# Patient Record
Sex: FEMALE | Race: WHITE | Employment: OTHER | URBAN - NONMETROPOLITAN AREA
[De-identification: names, ages, dates, MRNs, and addresses within clinical notes are randomized per-mention and may not be internally consistent; named-entity substitution may affect disease eponyms.]

---

## 2017-07-27 ENCOUNTER — HOSPITAL ENCOUNTER (EMERGENCY)
Facility: HOSPITAL | Age: 57
Discharge: HOME | End: 2017-07-27
Attending: NURSE PRACTITIONER | Admitting: NURSE PRACTITIONER
Payer: COMMERCIAL

## 2017-07-27 VITALS
TEMPERATURE: 98.4 F | HEART RATE: 65 BPM | RESPIRATION RATE: 16 BRPM | SYSTOLIC BLOOD PRESSURE: 100 MMHG | DIASTOLIC BLOOD PRESSURE: 59 MMHG | OXYGEN SATURATION: 98 %

## 2017-07-27 DIAGNOSIS — S61.411A LACERATION OF RIGHT HAND, FOREIGN BODY PRESENCE UNSPECIFIED, INITIAL ENCOUNTER: ICD-10-CM

## 2017-07-27 PROCEDURE — 12002 RPR S/N/AX/GEN/TRNK2.6-7.5CM: CPT

## 2017-07-27 PROCEDURE — 25000128 H RX IP 250 OP 636: Performed by: NURSE PRACTITIONER

## 2017-07-27 PROCEDURE — 90471 IMMUNIZATION ADMIN: CPT

## 2017-07-27 PROCEDURE — 90715 TDAP VACCINE 7 YRS/> IM: CPT | Performed by: NURSE PRACTITIONER

## 2017-07-27 PROCEDURE — 12002 RPR S/N/AX/GEN/TRNK2.6-7.5CM: CPT | Performed by: NURSE PRACTITIONER

## 2017-07-27 PROCEDURE — 40000268 ZZH STATISTIC NO CHARGES

## 2017-07-27 RX ADMIN — CLOSTRIDIUM TETANI TOXOID ANTIGEN (FORMALDEHYDE INACTIVATED), CORYNEBACTERIUM DIPHTHERIAE TOXOID ANTIGEN (FORMALDEHYDE INACTIVATED), BORDETELLA PERTUSSIS TOXOID ANTIGEN (GLUTARALDEHYDE INACTIVATED), BORDETELLA PERTUSSIS FILAMENTOUS HEMAGGLUTININ ANTIGEN (FORMALDEHYDE INACTIVATED), BORDETELLA PERTUSSIS PERTACTIN ANTIGEN, AND BORDETELLA PERTUSSIS FIMBRIAE 2/3 ANTIGEN 0.5 ML: 5; 2; 2.5; 5; 3; 5 INJECTION, SUSPENSION INTRAMUSCULAR at 18:55

## 2017-07-27 ASSESSMENT — ENCOUNTER SYMPTOMS
CONSTITUTIONAL NEGATIVE: 1
WOUND: 1

## 2017-07-27 NOTE — ED NOTES
C/o laceration to right hand while reaching into the  and cutting hand on knife bandage applied in triage

## 2017-07-27 NOTE — ED PROVIDER NOTES
"  History     Chief Complaint   Patient presents with     Laceration     Right hand.     The history is provided by the patient. No  was used.     Yoli Zarco is a 57 year old female who presents with a right hand laceration. Cut it on a knife at home taking it out of the . Wasn't able to control the bleeding at home. AFROM of hand and fingers.     I have reviewed the Medications, Allergies, Past Medical and Surgical History, and Social History in the Epic system.    Allergies:   Allergies   Allergen Reactions     Levaquin [Levofloxacin] GI Disturbance         No current facility-administered medications on file prior to encounter.   No current outpatient prescriptions on file prior to encounter.    Patient Active Problem List   Diagnosis     Dysfunctional uterine bleeding       Past Surgical History:   Procedure Laterality Date     CHOLECYSTECTOMY  2003       Social History   Substance Use Topics     Smoking status: Never Smoker     Smokeless tobacco: Never Used     Alcohol use No       Most Recent Immunizations   Administered Date(s) Administered     TDAP Vaccine (Adacel) 07/27/2017   Pended Date(s) Pended     TDAP Vaccine (Adacel) 07/27/2017       BMI: Estimated body mass index is 18.88 kg/(m^2) as calculated from the following:    Height as of 3/24/16: 1.676 m (5' 6\").    Weight as of 3/24/16: 53.1 kg (117 lb).      Review of Systems   Constitutional: Negative.    Musculoskeletal:        AFROM in Right hand   Skin: Positive for wound (hand laceration).       Physical Exam   BP: 100/59  Pulse: 65  Temp: 98.4  F (36.9  C)  Resp: 16  SpO2: 98 %  Physical Exam   Constitutional: She is oriented to person, place, and time. She appears well-developed and well-nourished. No distress.   HENT:   Head: Normocephalic and atraumatic.   Eyes: Conjunctivae are normal. No scleral icterus.   Neck: Normal range of motion.   Pulmonary/Chest: Effort normal.   Musculoskeletal: Normal range of " motion.        Right hand: She exhibits laceration. She exhibits normal range of motion and no deformity. Normal sensation noted.        Hands:  AFROM of R hand, sensation intact.    Neurological: She is alert and oriented to person, place, and time.   Skin: Skin is warm. She is not diaphoretic.   Psychiatric: She has a normal mood and affect. Her behavior is normal.   Nursing note and vitals reviewed.      ED Course     ED Course     Laceration repair  Performed by: LINCOLN JAQUEZ  Authorized by: LINCOLN JAQUEZ   Consent: Verbal consent obtained.  Risks and benefits: risks, benefits and alternatives were discussed  Consent given by: patient  Patient identity confirmed: verbally with patient and arm band  Body area: upper extremity  Location details: right hand  Laceration length: 3 (laceration between index finger and thumb) cm  Foreign bodies: no foreign bodies  Tendon involvement: none  Nerve involvement: none  Anesthesia: local infiltration    Anesthesia:  Local Anesthetic: lidocaine 2% with epinephrine  Anesthetic total: 2 mL    Sedation:  Patient sedated: no  Preparation: Patient was prepped and draped in the usual sterile fashion.  Skin closure: 4-0 nylon  Number of sutures: 4  Technique: simple  Approximation: close  Approximation difficulty: simple  Dressing: 4x4 sterile gauze, antibiotic ointment and splint        Assessments & Plan (with Medical Decision Making)     I have reviewed the nursing notes.    I have reviewed the findings, diagnosis, plan and need for follow up with the patient.    Plan: Keep clean and dry x 24 hours, then may shower as usual. Do not soak or swim  Take Tylenol per package directions for pain  Call tomorrow to make a follow up appointment for suture removal in 7 days  Watch for signs and symptoms of infection: Increasing redness, pain, warmth, fever and follow up here or with PCP if any arise  Patient verbally educated and discharge instructions given, verbalizes  understanding, and has no questions.      Final diagnoses:   Laceration of right hand, foreign body presence unspecified, initial encounter       7/27/2017   [facility]     Jenny Miller NP  07/27/17 1938

## 2017-07-27 NOTE — ED AVS SNAPSHOT
[facility]    [address] [address]    Phone:  [phone]                                       Yoli Zarco   MRN: [id number]    Department:  [facility]   Date of Visit:  7/27/2017           After Visit Summary Signature Page     I have received my discharge instructions, and my questions have been answered. I have discussed any challenges I see with this plan with the nurse or doctor.    ..........................................................................................................................................  Patient/Patient Representative Signature      ..........................................................................................................................................  Patient Representative Print Name and Relationship to Patient    ..................................................               ................................................  Date                                            Time    ..........................................................................................................................................  Reviewed by Signature/Title    ...................................................              ..............................................  Date                                                            Time

## 2017-07-27 NOTE — ED AVS SNAPSHOT
[facility]    [address] [address]    Phone:  [phone]                                       Yoli Zarco   MRN: [id number]    Department:  [facility]   Date of Visit:  7/27/2017           Patient Information     Date Of Birth          1960        Your diagnoses for this visit were:     Laceration of right hand, foreign body presence unspecified, initial encounter        You were seen by Jenny Miller NP.      Follow-up Information     Follow up with Cher Perry NP In 1 week.    Specialty:  Nurse Practitioner    Why:  For suture removal    Contact information:    [facility]  [address] [place] [address] [phone]          Follow up with [facility].    Specialty:  EMERGENCY MEDICINE    Why:  for suture removal if needed or symptoms develop    Contact information:    [address] [address] [phone]    Additional information:    From [facility]: Take [address]. Turn left at [address]. Turn left at the first stoplight on [address]. At the first stop sign, take a right onto [address]. Take a left into the parking lot and continue through until you reach the North enterance of the building.       From [place]: Take [address]. Take the [address] ramp towards [place]. Turn left onto [address]. Take a slight right onto [address]. Turn left at the first stoplight on [address]. At the first stop sign, take a right onto [address]. Take a left into the parking lot and continue through until you reach the North enterance of the building.       From [place]: Take [address]. Take a right at [address]. At the first stop sign, take a right onto [address]. Take a left into the parking lot and continue through until you reach the North enterance of the building.         Discharge  Instructions       You can stop in here to have the sutures removed next Thursday morning. Or see your PCP to have this done as well.   No soaking the hand. Watch for infection.   Use the splint during the day to avoid overusing the hand and opening the wound.    Change bandage 2 times a day.       Hand Laceration: All Closures  A laceration is a cut through the skin. You have a cut on the hand. Deep cuts usually require stitches (sutures) or staples. Minor cuts may be closed with surgical tape or skin adhesive.   X-rays may be done if something may have entered the skin through the cut. Your may also be given a tetanus shot. This may be given if you are not updated on this vaccination and the object that cut you may carry tetanus.    Home care    Your healthcare provider may prescribe an antibiotic. This is to help prevent infection. Follow all instructions for taking this medicine. Take the medicine every day until it is gone or you are told to stop. You should not have any left over.    The healthcare provider may prescribe medicines for pain. Follow instructions for taking them.    Follow the healthcare provider s instructions on how to care for the cut.    Keep the wound clean and dry. Do not get the wound wet until you are told it is okay to do so. If the bandage gets wet, remove it. Gently pat the wound dry with a clean cloth. Then put on a clean, dry bandage..    To help prevent infection, wash your hands with soap and water before and after caring for the wound.     Caring for sutures or staples: Once you no longer need to keep them dry, clean the wound daily. First, remove the bandage. Then wash the area gently with soap and warm water, or as directed by the health care provider. Use a wet cotton swab to loosen and remove any blood or crust that forms. After cleaning, apply a thin layer of antibiotic ointment if advised. Then put on a new bandage unless you are told not to.    Caring for skin glue: Don t  put apply liquid, ointment, or cream on the wound while the glue is in place. Avoid activities that cause heavy sweating. Protect the wound from sunlight. Do not scratch, rub, or pick at the adhesive film. Do not place tape directly over the film. The glue should peel off within 5 to 10 days.     Caring for surgical tape: Keep the area dry. If it gets wet, blot it dry with a clean towel. Surgical tape usually falls off within 7 to 10 days. If it has not fallen off after 10 days, you can take it off yourself. Put mineral oil or petroleum jelly on a cotton ball and gently rub the tape until it is removed.    Once you can get the wound wet, you may shower as usual but do not soak the wound in water (no tub baths or swimming)    Even with proper treatment, a wound infection may sometimes occur. Check the wound daily for signs of infection listed below.  Follow-up care  Follow up with your healthcare provider as advised. If you have stitches or staples, be sure to return as directed to have them removed.  When to seek medical advice  Call your healthcare provider right away if any of these occur:    Wound bleeding not controlled by direct pressure    Signs of infection, including increasing pain in the wound, increasing wound redness or swelling, or pus or bad odor coming from the wound    Fever of 100.4 F (38. C) or as directed by your health care provider    Stitches or staples come apart or fall out or surgical tape falls off before 7 days    Wound edges re-open    Wound changes colors    Numbness or weakness in the affected hand     Decreased movement of the hand  Date Last Reviewed: 6/10/2015    9185-0664 [employer]. [address] [place] [address], [place] [address]. All rights reserved. This information is not intended as a substitute for professional medical care. Always follow your healthcare professional's instructions.             Review of your medicines      Our records show that you are taking the  "medicines listed below. If these are incorrect, please call your family doctor or clinic.        Dose / Directions Last dose taken    VITAMIN D (CHOLECALCIFEROL) PO        Take by mouth daily   Refills:  0                Orders Needing Specimen Collection     None      Pending Results     No orders found from 2017 to 2017.            Pending Culture Results     No orders found from 2017 to 2017.            Thank you for choosing [facility]       Thank you for choosing [facility] for your care. Our goal is always to provide you with excellent care. Hearing back from our patients is one way we can continue to improve our services. Please take a few minutes to complete the written survey that you may receive in the mail after you visit with us. Thank you!        [employer] Information     [employer] lets you send messages to your doctor, view your test results, renew your prescriptions, schedule appointments and more. To sign up, go to [email or web address] . Click on \"Log in\" on the left side of the screen, which will take you to the Welcome page. Then click on \"Sign up Now\" on the right side of the page.     You will be asked to enter the access code listed below, as well as some personal information. Please follow the directions to create your username and password.     Your access code is: [email or web address]  Expires: 10/25/2017  7:35 PM     Your access code will  in 90 days. If you need help or a new code, please call your [facility] clinic or [phone].        Care EveryWhere ID     This is your Care EveryWhere ID. This could be used by other organizations to access your [facility] medical records  [id number]        Equal Access to Services     [facility]: Hadii ariel Perry, waaxda luqadaha, qaybta kaalmada delonte, sony delaney. So [facility] [phone].    ATENCIÓN: Si habla español, tiene a philip disposición servicios gratuitos de asistencia lingüística. " Linnea mera [phone].    We comply with applicable federal civil rights laws and [place] laws. We do not discriminate on the basis of race, color, national origin, age, disability sex, sexual orientation or gender identity.            After Visit Summary       This is your record. Keep this with you and show to your community pharmacist(s) and doctor(s) at your next visit.

## 2017-07-28 NOTE — DISCHARGE INSTRUCTIONS
You can stop in here to have the sutures removed next Thursday morning. Or see your PCP to have this done as well.   No soaking the hand. Watch for infection.   Use the splint during the day to avoid overusing the hand and opening the wound.    Change bandage 2 times a day.       Hand Laceration: All Closures  A laceration is a cut through the skin. You have a cut on the hand. Deep cuts usually require stitches (sutures) or staples. Minor cuts may be closed with surgical tape or skin adhesive.   X-rays may be done if something may have entered the skin through the cut. Your may also be given a tetanus shot. This may be given if you are not updated on this vaccination and the object that cut you may carry tetanus.    Home care    Your healthcare provider may prescribe an antibiotic. This is to help prevent infection. Follow all instructions for taking this medicine. Take the medicine every day until it is gone or you are told to stop. You should not have any left over.    The healthcare provider may prescribe medicines for pain. Follow instructions for taking them.    Follow the healthcare provider s instructions on how to care for the cut.    Keep the wound clean and dry. Do not get the wound wet until you are told it is okay to do so. If the bandage gets wet, remove it. Gently pat the wound dry with a clean cloth. Then put on a clean, dry bandage..    To help prevent infection, wash your hands with soap and water before and after caring for the wound.     Caring for sutures or staples: Once you no longer need to keep them dry, clean the wound daily. First, remove the bandage. Then wash the area gently with soap and warm water, or as directed by the health care provider. Use a wet cotton swab to loosen and remove any blood or crust that forms. After cleaning, apply a thin layer of antibiotic ointment if advised. Then put on a new bandage unless you are told not to.    Caring for skin glue: Don t put apply liquid,  ointment, or cream on the wound while the glue is in place. Avoid activities that cause heavy sweating. Protect the wound from sunlight. Do not scratch, rub, or pick at the adhesive film. Do not place tape directly over the film. The glue should peel off within 5 to 10 days.     Caring for surgical tape: Keep the area dry. If it gets wet, blot it dry with a clean towel. Surgical tape usually falls off within 7 to 10 days. If it has not fallen off after 10 days, you can take it off yourself. Put mineral oil or petroleum jelly on a cotton ball and gently rub the tape until it is removed.    Once you can get the wound wet, you may shower as usual but do not soak the wound in water (no tub baths or swimming)    Even with proper treatment, a wound infection may sometimes occur. Check the wound daily for signs of infection listed below.  Follow-up care  Follow up with your healthcare provider as advised. If you have stitches or staples, be sure to return as directed to have them removed.  When to seek medical advice  Call your healthcare provider right away if any of these occur:    Wound bleeding not controlled by direct pressure    Signs of infection, including increasing pain in the wound, increasing wound redness or swelling, or pus or bad odor coming from the wound    Fever of 100.4 F (38. C) or as directed by your health care provider    Stitches or staples come apart or fall out or surgical tape falls off before 7 days    Wound edges re-open    Wound changes colors    Numbness or weakness in the affected hand     Decreased movement of the hand  Date Last Reviewed: 6/10/2015    8445-5853 [employer]. [address] [place] [address], [place] [address]. All rights reserved. This information is not intended as a substitute for professional medical care. Always follow your healthcare professional's instructions.

## 2018-10-14 ENCOUNTER — APPOINTMENT (OUTPATIENT)
Dept: GENERAL RADIOLOGY | Facility: HOSPITAL | Age: 58
End: 2018-10-14
Attending: NURSE PRACTITIONER
Payer: COMMERCIAL

## 2018-10-14 ENCOUNTER — HOSPITAL ENCOUNTER (EMERGENCY)
Facility: HOSPITAL | Age: 58
Discharge: HOME | End: 2018-10-14
Attending: NURSE PRACTITIONER | Admitting: NURSE PRACTITIONER
Payer: COMMERCIAL

## 2018-10-14 VITALS
OXYGEN SATURATION: 99 % | RESPIRATION RATE: 16 BRPM | DIASTOLIC BLOOD PRESSURE: 70 MMHG | SYSTOLIC BLOOD PRESSURE: 123 MMHG | TEMPERATURE: 97 F

## 2018-10-14 DIAGNOSIS — S93.601A SPRAIN OF RIGHT FOOT, INITIAL ENCOUNTER: ICD-10-CM

## 2018-10-14 PROCEDURE — G0463 HOSPITAL OUTPT CLINIC VISIT: HCPCS

## 2018-10-14 PROCEDURE — 73630 X-RAY EXAM OF FOOT: CPT | Mod: TC,RT

## 2018-10-14 PROCEDURE — 99212 OFFICE O/P EST SF 10 MIN: CPT | Performed by: NURSE PRACTITIONER

## 2018-10-14 ASSESSMENT — ENCOUNTER SYMPTOMS
TROUBLE SWALLOWING: 0
ACTIVITY CHANGE: 1
WEAKNESS: 0
CHILLS: 0
COUGH: 0
PSYCHIATRIC NEGATIVE: 1
APPETITE CHANGE: 0
DYSURIA: 0
FEVER: 0
NUMBNESS: 0
COLOR CHANGE: 0
WOUND: 0

## 2018-10-14 NOTE — ED PROVIDER NOTES
History     Chief Complaint   Patient presents with     Foot Pain     rt foot pain, notes injury today while going up and down stairs     The history is provided by the patient and a relative (Daughter). No  was used.     Yoli Zarco is a 58 year old female who presents with right foot pain. She was stepping down on the stairs and felt pain in her right foot. Eating and drinking well. Bowel and bladder are working well. She is able to bear weight on heel of right foot.       Problem List:    Patient Active Problem List    Diagnosis Date Noted     Dysfunctional uterine bleeding 03/24/2016     Priority: Medium        Past Medical History:    Past Medical History:   Diagnosis Date     Blepharitis of both eyes      Cataracts, both eyes      Hx of concussion      Myopia, bilateral      Stress incontinence in female        Past Surgical History:    Past Surgical History:   Procedure Laterality Date     CHOLECYSTECTOMY  2003       Family History:    Family History   Problem Relation Age of Onset     Cerebrovascular Disease Father      Coronary Artery Disease Father      Diabetes Mother      Coronary Artery Disease Mother      Skin Cancer Sister      Sarcoidosis Sister      Diabetes Brother      Pancreatic Cancer Paternal Grandmother        Social History:  Marital Status:   [2]  Social History   Substance Use Topics     Smoking status: Never Smoker     Smokeless tobacco: Never Used     Alcohol use No        Medications:      VITAMIN D, CHOLECALCIFEROL, PO         Review of Systems   Constitutional: Positive for activity change. Negative for appetite change, chills and fever.   HENT: Negative for trouble swallowing.    Respiratory: Negative for cough.    Genitourinary: Negative for dysuria.   Musculoskeletal:        Right foot pain.    Skin: Negative for color change, rash and wound.   Neurological: Negative for weakness and numbness.   Psychiatric/Behavioral: Negative.        Physical Exam    BP: 123/70  Heart Rate: 62  Temp: 97  F (36.1  C)  Resp: 16  SpO2: 99 %      Physical Exam   Constitutional: She is oriented to person, place, and time. She appears well-developed and well-nourished. No distress.   HENT:   Head: Normocephalic.   Mouth/Throat: Oropharynx is clear and moist.   Neck: Normal range of motion. Neck supple.   Cardiovascular: Normal rate and intact distal pulses.    Pulmonary/Chest: Effort normal. No respiratory distress.   Abdominal: Soft. She exhibits no distension.   Musculoskeletal: She exhibits tenderness. She exhibits no edema or deformity.   CMS and ROM intact to right lower extremity. Right dorsalis pedis +2. Extension and flexion intact to right lower extremity. Tenderness to lateral midfoot along 5th metatarsal. Slight swelling appreciated. No deformity. Flexion and dorsiflexion intact to right foot.    Neurological: She is alert and oriented to person, place, and time. She exhibits normal muscle tone.   Skin: Skin is warm and dry. No rash noted. She is not diaphoretic.   Psychiatric: She has a normal mood and affect. Her behavior is normal.   Nursing note and vitals reviewed.      ED Course     ED Course     Procedures    I personally reviewed the x-rays and there is NO fracture or dislocation. Radiology review pending and nurse will notify patient if there is any change in the treatment plan.    Results for orders placed or performed during the hospital encounter of 10/14/18   Foot  XR, G/E 3 views, right    Narrative    PROCEDURE:  XR FOOT RT G/E 3 VW    HISTORY: Foot pain. Injured while going down stairs.; .    COMPARISON:  None.    TECHNIQUE:  3 views right foot.    FINDINGS:  Mild hallux valgus deformity with bunion formation is seen.  No fracture or dislocation is identified. The joint spaces are  preserved. No foreign body is seen.       Impression    IMPRESSION: No acute fracture.      TIFFANI HANCOCK MD       Assessments & Plan (with Medical Decision Making)      Discussed plan of care. She verbalized understanding. All questions answered.     I have reviewed the nursing notes.    I have reviewed the findings, diagnosis, plan and need for follow up with the patient.  Discharged in stable condition.     Discharge Medication List as of 10/14/2018  6:23 PM          Final diagnoses:   Sprain of right foot, initial encounter     Take tylenol and/or ibuprofen for pain. Follow dosing on package.   Apply ice to right foot for 20 minutes every 1-2 hours. Protect skin.   Elevate right foot as much as able.   See RICE handout.   Wear ace wrap and ortho shoe to right foot while walking. Take off when resting.   Work note.   Follow up with PCP in 10 days.   Return to urgent care or emergency department with any increase in symptoms or concerns.     AYANNA Mabry  10/14/2018  5:42 PM  [facility]       Eugenia Arita NP  10/16/18 0850

## 2018-10-14 NOTE — ED AVS SNAPSHOT
[facility]    [address] [address]    Phone:  [phone]                                       Yoli Zarco   MRN: [id number]    Department:  [facility]   Date of Visit:  10/14/2018           Patient Information     Date Of Birth          1960        Your diagnoses for this visit were:     Sprain of right foot, initial encounter        You were seen by Eugenia Arita NP.      Follow-up Information     Follow up with Cher Perry NP In 10 days.    Specialty:  Nurse Practitioner    Why:  For re-evaluation if pain persists.     Contact information:    [facility]  [address] [facility] [address] [phone]          Follow up with [facility].    Specialty:  EMERGENCY MEDICINE    Why:  As needed, If symptoms worsen    Contact information:    [address] [address] [phone]    Additional information:    From [facility]: Take [address]. Turn left at [address]. Turn left at the first stoplight on [address]. At the first stop sign, take a right onto [address]. Take a left into the parking lot and continue through until you reach the North enterance of the building.       From [place]: Take [address]. Take the [address] ramp towards [place]. Turn left onto [address]. Take a slight right onto [address]. Turn left at the first stoplight on [address]. At the first stop sign, take a right onto [address]. Take a left into the parking lot and continue through until you reach the North enterance of the building.       From [place]: Take [address]. Take a right at [address]. At the first stop sign, take a right onto [address]. Take a left into the parking lot and continue through until you reach the North enterance of the building.         Discharge Instructions       Take tylenol and/or ibuprofen  "for pain. Follow dosing on package.   Apply ice to right foot for 20 minutes every 1-2 hours. Protect skin.   Elevate right foot as much as able.   See RICE handout.   Wear ace wrap and ortho shoe to right foot while walking. Take off when resting.   Work note.   Follow up with PCP in 10 days.   Return to urgent care or emergency department with any increase in symptoms or concerns.       Discharge References/Attachments     FOOT SPRAIN (ENGLISH)    STRAINS AND SPRAINS, TREATING (ENGLISH)    R.I.C.E. (ENGLISH)         Review of your medicines      Our records show that you are taking the medicines listed below. If these are incorrect, please call your family doctor or clinic.        Dose / Directions Last dose taken    VITAMIN D (CHOLECALCIFEROL) PO        Take by mouth daily   Refills:  0                Procedures and tests performed during your visit     Foot  XR, G/E 3 views, right      Orders Needing Specimen Collection     None      Pending Results     Date and Time Order Name Status Description    10/14/2018 1742 Foot  XR, G/E 3 views, right In process             Pending Culture Results     No orders found from 10/12/2018 to 10/15/2018.            Thank you for choosing [facility]       Thank you for choosing [facility] for your care. Our goal is always to provide you with excellent care. Hearing back from our patients is one way we can continue to improve our services. Please take a few minutes to complete the written survey that you may receive in the mail after you visit with us. Thank you!        [facility] Information     [facility] lets you send messages to your doctor, view your test results, renew your prescriptions, schedule appointments and more. To sign up, go to [email or web address] . Click on \"Log in\" on the left side of the screen, which will take you to the Welcome page. Then click on \"Sign up Now\" on the right side of the page.     You will be asked to enter the access code listed below, as well as " some personal information. Please follow the directions to create your username and password.     Your access code is: [email or web address]  Expires: 2019  6:22 PM     Your access code will  in 90 days. If you need help or a new code, please call your [place] clinic or [phone].        Care EveryWhere ID     This is your Care EveryWhere ID. This could be used by other organizations to access your [place] medical records  [id number]        Equal Access to Services     [facility] : Hadfranca melendrezo Sobjorn, waaxda luqadaha, qaybta kaalmada aderobson, sony jimenez . So [facility] [phone].    ATENCIÓN: Si habla español, tiene a philip disposición servicios gratuitos de asistencia lingüística. Llame al [phone].    We comply with applicable federal civil rights laws and [place] laws. We do not discriminate on the basis of race, color, national origin, age, disability, sex, sexual orientation, or gender identity.            After Visit Summary       This is your record. Keep this with you and show to your community pharmacist(s) and doctor(s) at your next visit.

## 2018-10-14 NOTE — LETTER
[facility]  [address]  [place] [address]  Phone: [phone]    October 14, 2018        Yoli SHAH Carolee  [address] [place] [address]          To whom it may concern:    RE: Yoli Oviedocorey    Patient was seen and treated today at our clinic.    Please excuse from work 10-15-18 through 10-17-18.      Sincerely,        AYANNA Mabry  10/14/2018  6:29 PM  UR[facility]ENT [facility]ARE CLINIC

## 2018-10-14 NOTE — ED AVS SNAPSHOT
[facility]    [address] [address]    Phone:  [phone]                                       Yoli Zarco   MRN: [id number]    Department:  [facility]   Date of Visit:  10/14/2018           After Visit Summary Signature Page     I have received my discharge instructions, and my questions have been answered. I have discussed any challenges I see with this plan with the nurse or doctor.    ..........................................................................................................................................  Patient/Patient Representative Signature      ..........................................................................................................................................  Patient Representative Print Name and Relationship to Patient    ..................................................               ................................................  Date                                   Time    ..........................................................................................................................................  Reviewed by Signature/Title    ...................................................              ..............................................  Date                                               Time          22EPIC Rev 08/18

## 2018-10-14 NOTE — LETTER
[facility]  [address]  [place] [address]  Phone: [phone]    October 14, 2018        Yoli SHAH Carolee  [address] [place] [address]          To whom it may concern:    RE: Yoli Zarco    Patient was seen and treated today at our clinic.    Please excuse from work on 10-14-18, 10-15-18 & 10-16-18.       Sincerely,        AYANNA Mabry  10/14/2018  6:28 PM  UR[facility]ENT CARE CLINIC

## 2018-10-14 NOTE — ED NOTES
Patient presents with pain to RT foot on the top X today.  Patient is having difficulty putting pressure on foot.

## 2018-10-16 NOTE — DISCHARGE INSTRUCTIONS
Take tylenol and/or ibuprofen for pain. Follow dosing on package.   Apply ice to right foot for 20 minutes every 1-2 hours. Protect skin.   Elevate right foot as much as able.   See RICE handout.   Wear ace wrap and ortho shoe to right foot while walking. Take off when resting.   Work note.   Follow up with PCP in 10 days.   Return to urgent care or emergency department with any increase in symptoms or concerns.

## 2021-09-02 ENCOUNTER — APPOINTMENT (OUTPATIENT)
Dept: CT IMAGING | Facility: HOSPITAL | Age: 61
End: 2021-09-02
Attending: EMERGENCY MEDICINE
Payer: COMMERCIAL

## 2021-09-02 ENCOUNTER — HOSPITAL ENCOUNTER (EMERGENCY)
Facility: HOSPITAL | Age: 61
Discharge: HOME | End: 2021-09-02
Attending: EMERGENCY MEDICINE | Admitting: EMERGENCY MEDICINE
Payer: COMMERCIAL

## 2021-09-02 VITALS
DIASTOLIC BLOOD PRESSURE: 80 MMHG | TEMPERATURE: 97.6 F | RESPIRATION RATE: 16 BRPM | SYSTOLIC BLOOD PRESSURE: 147 MMHG | OXYGEN SATURATION: 98 % | HEART RATE: 56 BPM

## 2021-09-02 DIAGNOSIS — S10.93XA CONTUSION OF FACE, SCALP AND NECK, INITIAL ENCOUNTER: ICD-10-CM

## 2021-09-02 DIAGNOSIS — S01.81XA FACIAL LACERATION, INITIAL ENCOUNTER: ICD-10-CM

## 2021-09-02 DIAGNOSIS — S01.81XA LACERATION OF CHIN, INITIAL ENCOUNTER: ICD-10-CM

## 2021-09-02 DIAGNOSIS — S00.03XA CONTUSION OF FACE, SCALP AND NECK, INITIAL ENCOUNTER: ICD-10-CM

## 2021-09-02 DIAGNOSIS — S00.83XA CONTUSION OF FACE, SCALP AND NECK, INITIAL ENCOUNTER: ICD-10-CM

## 2021-09-02 PROCEDURE — 99284 EMERGENCY DEPT VISIT MOD MDM: CPT | Mod: 25

## 2021-09-02 PROCEDURE — 72125 CT NECK SPINE W/O DYE: CPT

## 2021-09-02 PROCEDURE — 12011 RPR F/E/E/N/L/M 2.5 CM/<: CPT

## 2021-09-02 PROCEDURE — 70450 CT HEAD/BRAIN W/O DYE: CPT

## 2021-09-02 PROCEDURE — 70486 CT MAXILLOFACIAL W/O DYE: CPT

## 2021-09-02 PROCEDURE — 99283 EMERGENCY DEPT VISIT LOW MDM: CPT | Mod: 25 | Performed by: EMERGENCY MEDICINE

## 2021-09-02 PROCEDURE — 12011 RPR F/E/E/N/L/M 2.5 CM/<: CPT | Performed by: EMERGENCY MEDICINE

## 2021-09-02 RX ORDER — ESTRADIOL AND LEVONORGESTREL .045; .015 MG/D; MG/D
PATCH TRANSDERMAL
COMMUNITY
Start: 2021-07-26

## 2021-09-02 ASSESSMENT — ENCOUNTER SYMPTOMS
ENDOCRINE NEGATIVE: 1
GASTROINTESTINAL NEGATIVE: 1
HEADACHES: 1
CARDIOVASCULAR NEGATIVE: 1
CONSTITUTIONAL NEGATIVE: 1
RESPIRATORY NEGATIVE: 1
WOUND: 1
EYES NEGATIVE: 1

## 2021-09-02 NOTE — ED TRIAGE NOTES
"Patient presents today as she states she was carrying something and tripped over a cooler and went down and hit her face. She has a chin laceration and states she's unsure if she loss consciousness. She states that her jaw hurts and her teeth. Denies any blood thinners or \"really no neck pain. \"  "

## 2021-09-02 NOTE — ED NOTES
Discharge instructions completed with patient and . Will follow up for suture removal in 1 week and return here with any questions or concerns.

## 2021-09-02 NOTE — ED PROVIDER NOTES
History     Chief Complaint   Patient presents with     Laceration     Fall     HPI  Yoli Zarco is a 61 year old female who tripped and fell while carrying something in her arms.  She landed on the tile floor.  She struck her face and chin.  She has a mild headache.  She does not believe she sustained a loss of consciousness.  She did sustain a laceration to her chin.  She complains of facial pain mainly.  She also states she has some mild neck pain.  She denies any injury to her upper extremities.  She denies any chest or abdominal pain.  She states she is not nauseous.  She denies any thoracic or lumbar back pain.  She is ambulatory to the emergency department with her .  Relates that her tetanus status is up-to-date.    Allergies:  Allergies   Allergen Reactions     Levaquin [Levofloxacin] GI Disturbance       Problem List:    Patient Active Problem List    Diagnosis Date Noted     Dysfunctional uterine bleeding 03/24/2016     Priority: Medium        Past Medical History:    Past Medical History:   Diagnosis Date     Blepharitis of both eyes      Cataracts, both eyes      Hx of concussion      Myopia, bilateral      Stress incontinence in female        Past Surgical History:    Past Surgical History:   Procedure Laterality Date     CHOLECYSTECTOMY  2003       Family History:    Family History   Problem Relation Age of Onset     Cerebrovascular Disease Father      Coronary Artery Disease Father      Diabetes Mother      Coronary Artery Disease Mother      Skin Cancer Sister      Sarcoidosis Sister      Diabetes Brother      Pancreatic Cancer Paternal Grandmother        Social History:  Marital Status:   [2]  Social History     Tobacco Use     Smoking status: Never Smoker     Smokeless tobacco: Never Used   Substance Use Topics     Alcohol use: No     Alcohol/week: 0.0 standard drinks     Drug use: No        Medications:    CLIMARA PRO 0.045-0.015 MG/DAY weekly patch  VITAMIN D,  CHOLECALCIFEROL, PO          Review of Systems   Constitutional: Negative.    HENT: Negative.    Eyes: Negative.    Respiratory: Negative.    Cardiovascular: Negative.    Gastrointestinal: Negative.    Endocrine: Negative.    Genitourinary: Negative.    Skin: Positive for wound.   Neurological: Positive for headaches.   Please see history of chief complaint.  All other appropriate systems reviewed and found unremarkable.    Physical Exam   BP: 147/80  Pulse: 77  Temp: 97.4  F (36.3  C)  Resp: 18      Physical Exam this is a 61-year-old female who is awake alert oriented person place and time.  She is very pleasant and cooperative with my exam.  HEENT normocephalic extraocular muscles intact pupils equally round and neck to light tympanic membranes clear teeth in good repair no evidence of dental trauma.  Patient does have some mild tenderness over the temporomandibular joints with opening and closing her mouth.  Tongue midline palate intact oropharynx is clear.  Patient has a 2 cm full-thickness laceration on the left submental aspect of her chin.  Wound edges sharp wound not grossly contaminated no foreign matter noted in the wound.  Neck is supple there are some mild soft tissue tenderness to palpation bilaterally on the neck.  There is no palpable midline bony tenderness or abnormality.  Lungs are clear bilaterally.  Heart maintains regular rate and rhythm S1 and S2 sounds are appreciated.  The abdomen is soft and nontender.  Extremities a full range of motion and 5/5 strength.  Neurologic exam no focal cranial nerve deficit.  Dermatologic exam no diffuse skin rashes or lesions are noted.    ED Course        Procedures patient's chin wound was sterilely prepped draped and anesthetized with L ET.  Additional 1% lidocaine was infused into the wound.  The wound was sterilely cleansed.  The wound edges were approximated using 7 interrupted 6-0 nylon sutures.  The patient tolerated this very well.  The wound was then  cleansed and antibiotic ointment was applied.     I discussed CT findings with the patient and her .  They are very relieved.  The patient will be discharged with appropriate discharge and wound care instructions.  I will advise the patient to return immediately if further problems should occur.  Also if any problems with the wound develop they should return for reevaluation.  I will advise that the sutures be removed in 7days.         Results for orders placed or performed during the hospital encounter of 09/02/21 (from the past 24 hour(s))   CT Head w/o Contrast    Narrative    PROCEDURE: CT HEAD W/O CONTRAST     HISTORY: Trauma - Head Injury.    COMPARISON: None.    TECHNIQUE:  Helical images of the head from the foramen magnum to the  vertex were obtained without contrast.    FINDINGS: The ventricles and sulci are normal in volume. No acute  intracranial hemorrhage, mass effect, midline shift, hydrocephalus or  basilar cystern effacement are present.    The grey-white matter interface is preserved.    The calvarium is intact. The mastoid air cells are clear.  The  visualized paranasal sinuses are clear.      Impression    IMPRESSION: No acute intracranial hemorrhage.    TIFFANI HANCOCK MD         SYSTEM ID:  [id number]   CT Maxillofacial w/o Contrast    Narrative    CT FACIAL BONES WITHOUT CONTRAST    HISTORY: Trauma - Facial Injury,    TECHNIQUE: Contiguous axial images through the facial bones were  performed without contrast.  Soft tissue and bone algorithms were  obtained. The images were reformatted in the sagittal and coronal  plane.     COMPARISON: None.    FINDINGS:  No acute facial bone fracture or dislocation is identified.   No orbital fracture is identified.  The globes are intact.  There is  no evidence of intraorbital hematoma or stranding.    The temporomandibular joints are intact. Scattered mild paranasal  sinus mucosal thickening is noted.      Impression    IMPRESSION:    No  evidence of acute facial bone fracture    TIFFANI HANCOCK MD         SYSTEM ID:  [id number]   CT Cervical Spine w/o Contrast    Narrative    PROCEDURE: CT CERVICAL SPINE W/O CONTRAST     HISTORY: Trauma - C-Spine Injury.    TECHNIQUE: Helical noncontrast CT images of the cervical spine.    COMPARISON: None.    FINDINGS:     No acute fracture is identified. The vertebral bodies are normal in  height. The cervical lordosis is partially straightened. The C1-2  articulation and the craniocervical junction are intact. Disc space  loss is seen at C5-6 and C6-7. No severe spinal stenosis is  identified.     The paravertebral soft tissues are unremarkable. The lung apices are  clear.      Impression    IMPRESSION: No evidence of acute cervical spine fracture.    TIFFANI HANCOCK MD         SYSTEM ID:  [id number]       Medications   lidocaine/EPINEPHrine/tetracaine (LET) solution KIT (has no administration in time range)       Assessments & Plan (with Medical Decision Making)     I have reviewed the nursing notes.    I have reviewed the findings, diagnosis, plan and need for follow up with the patient.  Plan is to discharge the patient with appropriate discharge and follow-up instructions.    New Prescriptions    No medications on file       Final diagnoses:   Facial laceration, initial encounter   Laceration of chin, initial encounter   Contusion of face, scalp and neck, initial encounter       9/2/2021   [facility]     Hussain Chan,   09/02/21 1261

## 2021-10-03 ENCOUNTER — HEALTH MAINTENANCE LETTER (OUTPATIENT)
Age: 61
End: 2021-10-03

## 2022-09-04 ENCOUNTER — HEALTH MAINTENANCE LETTER (OUTPATIENT)
Age: 62
End: 2022-09-04

## 2023-01-15 ENCOUNTER — HEALTH MAINTENANCE LETTER (OUTPATIENT)
Age: 63
End: 2023-01-15

## 2023-12-10 ENCOUNTER — HEALTH MAINTENANCE LETTER (OUTPATIENT)
Age: 63
End: 2023-12-10

## 2024-02-18 ENCOUNTER — HEALTH MAINTENANCE LETTER (OUTPATIENT)
Age: 64
End: 2024-02-18

## 2024-10-25 ENCOUNTER — MEDICAL CORRESPONDENCE (OUTPATIENT)
Dept: CT IMAGING | Facility: HOSPITAL | Age: 64
End: 2024-10-25

## 2024-10-28 ENCOUNTER — APPOINTMENT (OUTPATIENT)
Dept: GENERAL RADIOLOGY | Facility: HOSPITAL | Age: 64
End: 2024-10-28
Attending: NURSE PRACTITIONER
Payer: COMMERCIAL

## 2024-10-28 ENCOUNTER — HOSPITAL ENCOUNTER (OUTPATIENT)
Dept: CT IMAGING | Facility: HOSPITAL | Age: 64
Discharge: HOME | End: 2024-10-28
Attending: NURSE PRACTITIONER | Admitting: NURSE PRACTITIONER
Payer: COMMERCIAL

## 2024-10-28 ENCOUNTER — HOSPITAL ENCOUNTER (EMERGENCY)
Facility: HOSPITAL | Age: 64
Discharge: HOME | End: 2024-10-28
Attending: NURSE PRACTITIONER | Admitting: NURSE PRACTITIONER
Payer: COMMERCIAL

## 2024-10-28 VITALS
TEMPERATURE: 97 F | DIASTOLIC BLOOD PRESSURE: 56 MMHG | HEIGHT: 66 IN | BODY MASS INDEX: 18.87 KG/M2 | SYSTOLIC BLOOD PRESSURE: 118 MMHG | OXYGEN SATURATION: 98 % | WEIGHT: 117.4 LBS | HEART RATE: 68 BPM | RESPIRATION RATE: 15 BRPM

## 2024-10-28 DIAGNOSIS — S93.401A SPRAIN OF RIGHT ANKLE, UNSPECIFIED LIGAMENT, INITIAL ENCOUNTER: ICD-10-CM

## 2024-10-28 DIAGNOSIS — M54.2 CERVICALGIA: ICD-10-CM

## 2024-10-28 DIAGNOSIS — G89.29 OTHER CHRONIC PAIN: ICD-10-CM

## 2024-10-28 DIAGNOSIS — M50.30 OTHER CERVICAL DISC DEGENERATION, UNSPECIFIED CERVICAL REGION: ICD-10-CM

## 2024-10-28 DIAGNOSIS — S93.401A RIGHT ANKLE SPRAIN: Primary | ICD-10-CM

## 2024-10-28 PROCEDURE — 73610 X-RAY EXAM OF ANKLE: CPT | Mod: RT

## 2024-10-28 PROCEDURE — G0463 HOSPITAL OUTPT CLINIC VISIT: HCPCS

## 2024-10-28 PROCEDURE — 72125 CT NECK SPINE W/O DYE: CPT

## 2024-10-28 PROCEDURE — 99213 OFFICE O/P EST LOW 20 MIN: CPT | Performed by: NURSE PRACTITIONER

## 2024-10-28 PROCEDURE — 73630 X-RAY EXAM OF FOOT: CPT | Mod: RT

## 2024-10-28 RX ORDER — TRETINOIN 0.5 MG/G
CREAM TOPICAL
COMMUNITY
Start: 2024-10-02

## 2024-10-28 RX ORDER — ALBUTEROL SULFATE 90 UG/1
INHALANT RESPIRATORY (INHALATION)
COMMUNITY
Start: 2024-03-28

## 2024-10-28 RX ORDER — IBUPROFEN 400 MG/1
TABLET, FILM COATED ORAL
COMMUNITY
Start: 2024-06-14

## 2024-10-28 RX ORDER — PSEUDOEPHED/ACETAMINOPH/DIPHEN 30MG-500MG
TABLET ORAL
COMMUNITY
Start: 2024-06-14

## 2024-10-28 ASSESSMENT — ENCOUNTER SYMPTOMS
CHILLS: 0
SHORTNESS OF BREATH: 0
DIARRHEA: 0
NAUSEA: 0
FEVER: 0
PSYCHIATRIC NEGATIVE: 1
VOMITING: 0

## 2024-10-28 ASSESSMENT — ACTIVITIES OF DAILY LIVING (ADL): ADLS_ACUITY_SCORE: 0

## 2024-10-28 NOTE — ED PROVIDER NOTES
History     Chief Complaint   Patient presents with    Ankle Pain     Right ankle and foot     HPI  Yoli Zarco is a 64 year old female who presents to urgent care today ambulatory with complaints of right foot and ankle pain after patient had rolled her ankle while running to take a picture of a [place] two nights ago.  Previous fracture to right foot several years ago.  Has been doing RICE at home.  Full ROM.  Did not fall, no LOC or any other injuries.  Took ibuprofen, no other OTC meds.  No other concerns    Allergies:  Allergies   Allergen Reactions    Levaquin [Levofloxacin] GI Disturbance       Problem List:    Patient Active Problem List    Diagnosis Date Noted    Dysfunctional uterine bleeding 03/24/2016     Priority: Medium        Past Medical History:    Past Medical History:   Diagnosis Date    Blepharitis of both eyes     Cataracts, both eyes     Hx of concussion     Myopia, bilateral     Stress incontinence in female        Past Surgical History:    Past Surgical History:   Procedure Laterality Date    CHOLECYSTECTOMY  2003       Family History:    Family History   Problem Relation Age of Onset    Cerebrovascular Disease Father     Coronary Artery Disease Father     Diabetes Mother     Coronary Artery Disease Mother     Skin Cancer Sister     Sarcoidosis Sister     Diabetes Brother     Pancreatic Cancer Paternal Grandmother        Social History:  Marital Status:   [2]  Social History     Tobacco Use    Smoking status: Never    Smokeless tobacco: Never   Substance Use Topics    Alcohol use: No     Alcohol/week: 0.0 standard drinks of alcohol    Drug use: No        Medications:    acetaminophen (TYLENOL) 500 MG tablet  albuterol (PROAIR HFA/PROVENTIL HFA/VENTOLIN HFA) 108 (90 Base) MCG/ACT inhaler  CLIMARA PRO 0.045-0.015 MG/DAY weekly patch  ibuprofen (ADVIL/MOTRIN) 400 MG tablet  tretinoin (RETIN-A) 0.05 % external cream  VITAMIN D, CHOLECALCIFEROL, PO      Review of Systems  "  Constitutional:  Negative for chills and fever.   Respiratory:  Negative for shortness of breath.    Cardiovascular:  Negative for chest pain.   Gastrointestinal:  Negative for diarrhea, nausea and vomiting.   Musculoskeletal:  Negative for gait problem.        Right foot and ankle pain   Skin: Negative.    Psychiatric/Behavioral: Negative.       Physical Exam   BP: 118/56  Pulse: 68  Temp: 97  F (36.1  C)  Resp: 15  Height: 167.6 cm (5' 6\")  Weight: 53.3 kg (117 lb 6.4 oz)  SpO2: 98 %    Physical Exam  Vitals and nursing note reviewed.   Constitutional:       General: She is not in acute distress.     Appearance: Normal appearance. She is not ill-appearing or toxic-appearing.   Cardiovascular:      Rate and Rhythm: Normal rate and regular rhythm.      Pulses: Normal pulses.      Heart sounds: Normal heart sounds.   Pulmonary:      Effort: Pulmonary effort is normal.      Breath sounds: Normal breath sounds.   Musculoskeletal:      Right ankle: No swelling, deformity, ecchymosis or lacerations. Tenderness present over the ATF ligament. Normal range of motion. Normal pulse.      Right foot: Normal range of motion and normal capillary refill. Bony tenderness (fourth metatarsal) present. No swelling, deformity, laceration or crepitus. Normal pulse.   Skin:     General: Skin is warm and dry.      Capillary Refill: Capillary refill takes less than 2 seconds.   Neurological:      Mental Status: She is alert.   Psychiatric:         Mood and Affect: Mood normal.       ED Course     Procedures    Results for orders placed or performed during the hospital encounter of 10/28/24 (from the past 24 hours)   Foot  XR, G/E 3 views, right    Narrative    PROCEDURE:  XR FOOT RIGHT G/E 3 VIEWS    HISTORY: right foot pain    COMPARISON:  None.    TECHNIQUE:  3 views of the right foot were obtained.    FINDINGS:  No fracture or dislocation is identified. The joint spaces  are preserved.        Impression    IMPRESSION: Normal right " foot    SHABANA EUGENE MD         SYSTEM ID:  [id number]   Ankle XR, G/E 3 views, right    Narrative    PROCEDURE:  XR ANKLE RIGHT G/E 3 VIEWS    HISTORY: right ankle pain    COMPARISON:  None.    TECHNIQUE:  3 views of the right ankle were obtained.    FINDINGS:  No fracture or dislocation is identified. The joint spaces  are preserved.  There is a calcification seen posterior to the  tibiotalar possibly an intra-articular loose body.      Impression    IMPRESSION: No fractures or destructive lesions.    SHABANA EUGENE MD         SYSTEM ID:  [id number]       Medications - No data to display    Assessments & Plan (with Medical Decision Making)     I have reviewed the nursing notes.    I have reviewed the findings, diagnosis, plan and need for follow up with the patient.  (S93.305A) Right ankle sprain  (primary encounter diagnosis)  Plan:   Patient ambulatory with a nontoxic appearance.  Patient will stand from seated position in order to ambulate.  Patient arrived with complaints of right ankle pain and right foot pain primarily to the fourth metatarsal.  X-ray to right foot shows normal foot, right ankle x-ray shows no fracture or destructive lesions.  Given pain at ATFL, consistent with mild right ankle sprain.  Patient to follow RICE.  Ace wrap applied.  Alternate Tylenol and ibuprofen as needed for pain.  Declines needing any crutches.  Follow-up with primary care provider or return to urgent care/ED with any worsening in condition or additional concerns.  Patient in agreement treatment plan.    New Prescriptions    No medications on file     Final diagnoses:   Right ankle sprain     10/28/2024   [facility]       Teresa De Luna NP  10/28/24 1015

## 2024-10-28 NOTE — DISCHARGE INSTRUCTIONS
Rest  Ice  Compression with ace wrap  Elevate  Alternate tylenol and ibuprofen as needed for pain  Follow-up with primary care provider or return to urgent care/ED with any worsening in condition or additional concerns peer

## 2024-10-28 NOTE — ED TRIAGE NOTES
Pt presents with c/o right ankle and foot pain. Reports she went to get a picture of the sunset and ran down the hill to do so, stepped wrong and rolled it. Incident happened Saturday night. CMS intact. Limited ROM due to pain. Slight limp with ambulation. Resting, elevating, and icing all day yesterday. Pt also took ibuprofen.

## 2025-03-09 ENCOUNTER — HEALTH MAINTENANCE LETTER (OUTPATIENT)
Age: 65
End: 2025-03-09

## 2025-04-17 ENCOUNTER — HOSPITAL ENCOUNTER (EMERGENCY)
Facility: HOSPITAL | Age: 65
Discharge: HOME | End: 2025-04-17
Attending: NURSE PRACTITIONER
Payer: COMMERCIAL

## 2025-04-17 ENCOUNTER — APPOINTMENT (OUTPATIENT)
Dept: ULTRASOUND IMAGING | Facility: HOSPITAL | Age: 65
End: 2025-04-17
Attending: NURSE PRACTITIONER
Payer: COMMERCIAL

## 2025-04-17 VITALS
RESPIRATION RATE: 16 BRPM | OXYGEN SATURATION: 99 % | HEART RATE: 75 BPM | TEMPERATURE: 98 F | SYSTOLIC BLOOD PRESSURE: 108 MMHG | DIASTOLIC BLOOD PRESSURE: 80 MMHG

## 2025-04-17 DIAGNOSIS — R10.9 LEFT FLANK PAIN: ICD-10-CM

## 2025-04-17 LAB
ALBUMIN UR-MCNC: NEGATIVE MG/DL
APPEARANCE UR: CLEAR
BILIRUB UR QL STRIP: NEGATIVE
COLOR UR AUTO: ABNORMAL
GLUCOSE UR STRIP-MCNC: NEGATIVE MG/DL
HGB UR QL STRIP: NEGATIVE
KETONES UR STRIP-MCNC: ABNORMAL MG/DL
LEUKOCYTE ESTERASE UR QL STRIP: NEGATIVE
NITRATE UR QL: NEGATIVE
PH UR STRIP: 6 [PH] (ref 4.7–8)
RBC URINE: 0 /HPF
SP GR UR STRIP: 1 (ref 1–1.03)
SQUAMOUS EPITHELIAL: 1 /HPF
UROBILINOGEN UR STRIP-MCNC: NORMAL MG/DL
WBC URINE: <1 /HPF

## 2025-04-17 PROCEDURE — 99284 EMERGENCY DEPT VISIT MOD MDM: CPT | Mod: 25

## 2025-04-17 PROCEDURE — 76705 ECHO EXAM OF ABDOMEN: CPT | Mod: TC

## 2025-04-17 PROCEDURE — 99284 EMERGENCY DEPT VISIT MOD MDM: CPT | Mod: 25 | Performed by: NURSE PRACTITIONER

## 2025-04-17 PROCEDURE — 76705 ECHO EXAM OF ABDOMEN: CPT | Mod: 26 | Performed by: NURSE PRACTITIONER

## 2025-04-17 PROCEDURE — 81003 URINALYSIS AUTO W/O SCOPE: CPT | Performed by: NURSE PRACTITIONER

## 2025-04-17 ASSESSMENT — ENCOUNTER SYMPTOMS
FLANK PAIN: 1
CONSTIPATION: 0
CONSTITUTIONAL NEGATIVE: 1
PSYCHIATRIC NEGATIVE: 1
CARDIOVASCULAR NEGATIVE: 1
HEMATOLOGIC/LYMPHATIC NEGATIVE: 1
DYSURIA: 0
DIARRHEA: 1
ENDOCRINE NEGATIVE: 1
RESPIRATORY NEGATIVE: 1
HEMATURIA: 0
NAUSEA: 0
NEUROLOGICAL NEGATIVE: 1
ABDOMINAL PAIN: 0
EYES NEGATIVE: 1
VOMITING: 0
BLOOD IN STOOL: 0
ALLERGIC/IMMUNOLOGIC NEGATIVE: 1

## 2025-04-17 ASSESSMENT — COLUMBIA-SUICIDE SEVERITY RATING SCALE - C-SSRS
6. HAVE YOU EVER DONE ANYTHING, STARTED TO DO ANYTHING, OR PREPARED TO DO ANYTHING TO END YOUR LIFE?: NO
1. IN THE PAST MONTH, HAVE YOU WISHED YOU WERE DEAD OR WISHED YOU COULD GO TO SLEEP AND NOT WAKE UP?: NO
2. HAVE YOU ACTUALLY HAD ANY THOUGHTS OF KILLING YOURSELF IN THE PAST MONTH?: NO

## 2025-04-17 ASSESSMENT — ACTIVITIES OF DAILY LIVING (ADL)
ADLS_ACUITY_SCORE: 41
ADLS_ACUITY_SCORE: 41

## 2025-04-17 NOTE — ED TRIAGE NOTES
Patient was evaluated in triage by Urgent Care provider ANNA MARIE De Luna CNP and deemed inappropriate for UC.    Patient to be seen in Emergency Department.    Pt reports sinus infection and currently on amoxicillin. Pt states diarrhea started yesterday and today was doing better, however today pt started having left flank pain and is concerned about a kidney stone.

## 2025-04-17 NOTE — DISCHARGE INSTRUCTIONS
Pain control:   If your past medical conditions, allergies, current medications, or current status does not prevent you from using acetaminophen and/or ibuprofen, use the following:   Acetaminophen 650-1000 mg every 6 hours as needed for pain in addition to ibuprofen 400 mg every 6 hours as needed for pain.  Take these two medications together if wanted.    Remember that these are for AS NEEDED.  If not needed, do not take.                  Today we completed a workup for left flank pain.  Sometimes, we do not always find the cause for your symptoms in one ER visit.  The findings on your exam today and on your blood work and/or imaging is reassuring.  At this time, it is not 100% certain what is causing your symptoms, but we feel you can be discharged from the emergency department.  It is possible this may worsen or you may get better.  Please, a few get worse or her symptoms change, return to the emergency department.    Otherwise, please follow-up with your primary care doctor or any of the specialists we have provided or recommended.     Follow-up with your primary care provider for reevaluation.  Contact your primary care provider if you have any questions or concerns.  Do not hesitate to return to the ER if any new or worsening symptoms.     Please read the attached instructions (if any).  They highlight more specific treatments and interventions for you at home.              Thank you for letting me participate in your care and wish you a fast and uneventful recovery,    Maxwell BILLINGSLEY CNP    Do not hesitate to contact me with questions or concerns.  [email or web address]  [email or web address]

## 2025-04-17 NOTE — ED PROVIDER NOTES
"  History     Chief Complaint   Patient presents with    Flank Pain     HPI  Yoli Zarco is a 64 year old individual comes in for complaints of left flank pain.  Patient states that she woke up around 0900 today with left flank pain.  Is concerned about passing a kidney stone.  Patient states has been on Augmentin for 2 days for sinus infection.  Was feeling \"great yesterday\".  Did have a couple loose stools so did not take antibiotics today but did have the left flank pain.  No fever or chills.  No dysuria or hematuria reported.  No abdominal pain, nausea, vomiting.    Allergies:  Allergies   Allergen Reactions    Levaquin [Levofloxacin] GI Disturbance       Problem List:    Patient Active Problem List    Diagnosis Date Noted    Dysfunctional uterine bleeding 03/24/2016     Priority: Medium        Past Medical History:    Past Medical History:   Diagnosis Date    Blepharitis of both eyes     Cataracts, both eyes     Hx of concussion     Myopia, bilateral     Stress incontinence in female        Past Surgical History:    Past Surgical History:   Procedure Laterality Date    CHOLECYSTECTOMY  2003       Family History:    Family History   Problem Relation Age of Onset    Cerebrovascular Disease Father     Coronary Artery Disease Father     Diabetes Mother     Coronary Artery Disease Mother     Skin Cancer Sister     Sarcoidosis Sister     Diabetes Brother     Pancreatic Cancer Paternal Grandmother        Social History:  Marital Status:   [2]  Social History     Tobacco Use    Smoking status: Never    Smokeless tobacco: Never   Substance Use Topics    Alcohol use: No     Alcohol/week: 0.0 standard drinks of alcohol    Drug use: No        Medications:    acetaminophen (TYLENOL) 500 MG tablet  albuterol (PROAIR HFA/PROVENTIL HFA/VENTOLIN HFA) 108 (90 Base) MCG/ACT inhaler  CLIMARA PRO 0.045-0.015 MG/DAY weekly patch  ibuprofen (ADVIL/MOTRIN) 400 MG tablet  tretinoin (RETIN-A) 0.05 % external cream  VITAMIN " D, CHOLECALCIFEROL, PO          Review of Systems   Constitutional: Negative.    HENT: Negative.     Eyes: Negative.    Respiratory: Negative.     Cardiovascular: Negative.    Gastrointestinal:  Positive for diarrhea. Negative for abdominal pain, blood in stool, constipation, nausea and vomiting.   Endocrine: Negative.    Genitourinary:  Positive for flank pain. Negative for decreased urine volume, dysuria, hematuria, pelvic pain, vaginal bleeding and vaginal discharge.   Skin: Negative.    Allergic/Immunologic: Negative.    Neurological: Negative.    Hematological: Negative.    Psychiatric/Behavioral: Negative.         Physical Exam   BP: (!) 156/76  Pulse: 78  Temp: 98  F (36.7  C)  Resp: 16  SpO2: 98 %      GENERAL APPEARANCE:  The patient is a 64 year old well-developed, well-nourished individual that appears as stated age.  LUNGS:  Breathing is easy.  Breath sounds are equal and clear bilaterally.  No wheezes, rhonchi, or rales.  HEART:  Regular rate and rhythm with normal S1 and S2.  No murmurs, gallops, or rubs.  ABDOMEN:  Soft, flat, and benign.  No mass, tenderness, guarding, or rebound.  No organomegaly or hernia.  Bowel sounds are present.  No CVA tenderness or flank mass.  No abdominal bruits or thrills present upon auscultation/palpation.  GENITOURINARY: No obvious anterior pelvic tenderness, hernia, mass noted to palpation.  PSYCHIATRIC:  The patient is awake, alert, and oriented x4.  Recent and remote memory is intact.  Appropriate mood and affect.  Calm and cooperative with history and physical exam.  SKIN:  Warm, dry, and well perfused.  Good turgor.  No lesions, nodules, or rashes are noted.  No bruising noted.      ED Course     ED Course as of 04/17/25 1446   Thu Apr 17, 2025   1350 Lab ordered.   1350 In to see patient and history/physical completed.    1405 POCUS renal ultrasound shows no obvious hydronephrosis or ureteral obstruction.   1440 Urine negative.  Discussed further evaluation with  patient but she would like to defer at this time and try to see if symptoms continue or dissipate over time.  For this reason we will discharge home to do acetaminophen/ibuprofen and hydration.  Follow-up recommendations and return precautions given.     POC US ABDOMEN LIMITED    Date/Time: 4/17/2025 2:05 PM    Performed by: Maxwell Mancilla APRN CNP  Authorized by: Maxwell Mancilla APRN CNP    Procedure details:     Indications: flank pain      Assessment for:  Hydronephrosis    Left renal:  Visualized    Right renal:  Visualized    Bladder:  Visualized  Left renal findings:     Ureteral jets: identified      Intra-abdominal fluid: not identified      Perinephric fluid: not identified      Hydronephrosis: none    Right renal findings:     Mass: identified (Cyst noted)      Ureteral jets: identified      Intra-abdominal fluid: not identified      Perinephric fluid: not identified      Hydronephrosis: none    Bladder findings:     Free pelvic fluid: not identified             Results for orders placed or performed during the hospital encounter of 04/17/25 (from the past 24 hours)   UA with Microscopic reflex to Culture    Specimen: Urine, Clean Catch   Result Value Ref Range    Color Urine Straw Colorless, Straw, Light Yellow, Yellow    Appearance Urine Clear Clear    Glucose Urine Negative Negative mg/dL    Bilirubin Urine Negative Negative    Ketones Urine Trace (A) Negative mg/dL    Specific Gravity Urine 1.005 1.003 - 1.035    Blood Urine Negative Negative    pH Urine 6.0 4.7 - 8.0    Protein Albumin Urine Negative Negative mg/dL    Urobilinogen Urine Normal Normal mg/dL    Nitrite Urine Negative Negative    Leukocyte Esterase Urine Negative Negative    RBC Urine 0 <=2 /HPF    WBC Urine <1 <=5 /HPF    Squamous Epithelials Urine 1 <=1 /HPF    Narrative    Urine Culture not indicated       Medications - No data to display    Assessments & Plan (with Medical Decision Making)     I have reviewed the nursing  notes.    I have reviewed the findings, diagnosis, plan and need for follow up with the patient.    Summary:  Patient presents to the ER today left flank pain.  Potential diagnosis which have been considered and evaluated include ureteral stone, pyelonephritis, diverticulitis, appendicitis, cholecystitis, pancreatitis, mesenteric ischemia, intussusception, colitis, as well as others. Many of these have been excluded using the various modalities and assessment as noted on the chart. At the present time, the diagnosis given seems to be the most likely left flank pain without etiology.  Upon arrival, vitals signs are normal.  The patient is alert and oriented no distress.  Cardiac and respiratory examination normal.  No abdominal tenderness, hernia, mass noted to palpation.  No reproducible CVA tenderness to palpation.  No flank mass present.  No anterior pelvic tenderness, hernia, mass noted to palpation.  POCUS renal ultrasound conducted showing no hydronephrosis.  Bilateral ureteral jets present so no obstruction likely.  UA collected and shows no acute abnormality.  Discussed further workup with patient as she had some loose stools which could be colon related.  Patient deferred and will want to try to see how it goes at home.  Will try acetaminophen/ibuprofen and hydration.  Did educate patient to follow-up with PCP and return to ER if new worsening symptoms.  Patient verbalized understand agrees with plan of care.  Patient discharged home.      Critical Care Time: None    Impression and plan discussed with patient. Questions answered, concerns addressed, indications for urgent re-evaluation reviewed, and  given. Patient/Parent/Caregiver agree with treatment plan and have no further questions at this time.  AVS provided at discharge.    This document was prepared using a combination of typing and voice generated software.  While every attempt was made for accuracy, spelling and grammatical errors may exist.               New Prescriptions    No medications on file       Final diagnoses:   Left flank pain       4/17/2025   [facility]       Maxwell Mancilla, ANALILIA CNP  04/17/25 1445